# Patient Record
Sex: FEMALE | Race: WHITE | Employment: FULL TIME | URBAN - METROPOLITAN AREA
[De-identification: names, ages, dates, MRNs, and addresses within clinical notes are randomized per-mention and may not be internally consistent; named-entity substitution may affect disease eponyms.]

---

## 2018-04-16 ENCOUNTER — OFFICE VISIT (OUTPATIENT)
Dept: NEUROLOGY | Age: 63
End: 2018-04-16
Payer: COMMERCIAL

## 2018-04-16 VITALS
BODY MASS INDEX: 47.37 KG/M2 | WEIGHT: 235 LBS | HEIGHT: 59 IN | HEART RATE: 88 BPM | DIASTOLIC BLOOD PRESSURE: 95 MMHG | SYSTOLIC BLOOD PRESSURE: 188 MMHG

## 2018-04-16 DIAGNOSIS — I10 HYPERTENSION, UNSPECIFIED TYPE: ICD-10-CM

## 2018-04-16 DIAGNOSIS — Z86.73 HISTORY OF STROKE: Primary | ICD-10-CM

## 2018-04-16 DIAGNOSIS — E11.65 POORLY CONTROLLED DIABETES MELLITUS (HCC): ICD-10-CM

## 2018-04-16 PROCEDURE — 99205 OFFICE O/P NEW HI 60 MIN: CPT | Performed by: PSYCHIATRY & NEUROLOGY

## 2018-04-16 RX ORDER — ATORVASTATIN CALCIUM 40 MG/1
20 TABLET, FILM COATED ORAL DAILY
COMMUNITY

## 2018-04-23 ENCOUNTER — TELEPHONE (OUTPATIENT)
Dept: NEUROLOGY | Age: 63
End: 2018-04-23

## 2018-05-03 ENCOUNTER — TELEPHONE (OUTPATIENT)
Dept: NEUROLOGY | Age: 63
End: 2018-05-03

## 2018-05-07 ENCOUNTER — HOSPITAL ENCOUNTER (OUTPATIENT)
Dept: MRI IMAGING | Age: 63
Discharge: HOME | End: 2018-05-09
Payer: COMMERCIAL

## 2018-05-07 ENCOUNTER — APPOINTMENT (OUTPATIENT)
Dept: LAB | Age: 63
End: 2018-05-07
Payer: COMMERCIAL

## 2018-05-07 ENCOUNTER — APPOINTMENT (OUTPATIENT)
Dept: MRI IMAGING | Age: 63
End: 2018-05-07
Payer: COMMERCIAL

## 2018-05-07 DIAGNOSIS — Z86.73 HISTORY OF STROKE: ICD-10-CM

## 2018-05-07 DIAGNOSIS — I10 HYPERTENSION, UNSPECIFIED TYPE: ICD-10-CM

## 2018-05-07 PROCEDURE — 70544 MR ANGIOGRAPHY HEAD W/O DYE: CPT

## 2018-05-08 ENCOUNTER — HOSPITAL ENCOUNTER (OUTPATIENT)
Dept: LAB | Age: 63
Setting detail: SPECIMEN
Discharge: HOME | End: 2018-05-08
Payer: COMMERCIAL

## 2018-05-08 DIAGNOSIS — Z86.73 HISTORY OF STROKE: ICD-10-CM

## 2018-05-08 DIAGNOSIS — I10 HYPERTENSION, UNSPECIFIED TYPE: ICD-10-CM

## 2018-05-08 LAB
ABSOLUTE EOS #: 0.2 K/UL (ref 0–0.4)
ABSOLUTE IMMATURE GRANULOCYTE: ABNORMAL K/UL (ref 0–0.3)
ABSOLUTE LYMPH #: 1.9 K/UL (ref 1–4.8)
ABSOLUTE MONO #: 0.4 K/UL (ref 0.1–1.2)
ALBUMIN SERPL-MCNC: 4.2 G/DL (ref 3.5–5.2)
ALBUMIN/GLOBULIN RATIO: 1.3 (ref 1–2.5)
ALP BLD-CCNC: 92 U/L (ref 35–104)
ALT SERPL-CCNC: 18 U/L (ref 5–33)
ANION GAP SERPL CALCULATED.3IONS-SCNC: 11 MMOL/L (ref 9–17)
ANION GAP SERPL CALCULATED.3IONS-SCNC: 11 MMOL/L (ref 9–17)
AST SERPL-CCNC: 15 U/L
BASOPHILS # BLD: 1 % (ref 0–1)
BASOPHILS ABSOLUTE: 0.1 K/UL (ref 0–0.2)
BILIRUB SERPL-MCNC: 0.67 MG/DL (ref 0.3–1.2)
BUN BLDV-MCNC: 17 MG/DL (ref 8–23)
BUN BLDV-MCNC: 17 MG/DL (ref 8–23)
BUN/CREAT BLD: 32 (ref 9–20)
BUN/CREAT BLD: 32 (ref 9–20)
CALCIUM SERPL-MCNC: 9.6 MG/DL (ref 8.6–10.4)
CALCIUM SERPL-MCNC: 9.6 MG/DL (ref 8.6–10.4)
CHLORIDE BLD-SCNC: 95 MMOL/L (ref 98–107)
CHLORIDE BLD-SCNC: 95 MMOL/L (ref 98–107)
CHOLESTEROL/HDL RATIO: 2.2
CHOLESTEROL: 146 MG/DL
CO2: 32 MMOL/L (ref 20–31)
CO2: 32 MMOL/L (ref 20–31)
CREAT SERPL-MCNC: 0.53 MG/DL (ref 0.5–0.9)
CREAT SERPL-MCNC: 0.53 MG/DL (ref 0.5–0.9)
DIFFERENTIAL TYPE: ABNORMAL
EOSINOPHILS RELATIVE PERCENT: 3 % (ref 1–7)
ESTIMATED AVERAGE GLUCOSE: 214 MG/DL
GFR AFRICAN AMERICAN: >60 ML/MIN
GFR AFRICAN AMERICAN: >60 ML/MIN
GFR NON-AFRICAN AMERICAN: >60 ML/MIN
GFR NON-AFRICAN AMERICAN: >60 ML/MIN
GFR SERPL CREATININE-BSD FRML MDRD: ABNORMAL ML/MIN/{1.73_M2}
GLUCOSE BLD-MCNC: 204 MG/DL (ref 70–99)
GLUCOSE BLD-MCNC: 204 MG/DL (ref 70–99)
HBA1C MFR BLD: 9.1 % (ref 4.8–5.9)
HCT VFR BLD CALC: 44.7 % (ref 36–46)
HDLC SERPL-MCNC: 65 MG/DL
HEMOGLOBIN: 15 G/DL (ref 12–16)
IMMATURE GRANULOCYTES: ABNORMAL %
LDL CHOLESTEROL: 64 MG/DL (ref 0–130)
LYMPHOCYTES # BLD: 31 % (ref 16–46)
MCH RBC QN AUTO: 27.9 PG (ref 26–34)
MCHC RBC AUTO-ENTMCNC: 33.5 G/DL (ref 31–37)
MCV RBC AUTO: 83.2 FL (ref 80–100)
MONOCYTES # BLD: 7 % (ref 4–11)
NRBC AUTOMATED: ABNORMAL PER 100 WBC
PDW BLD-RTO: 13.3 % (ref 11–14.5)
PLATELET # BLD: 222 K/UL (ref 140–450)
PLATELET ESTIMATE: ABNORMAL
PMV BLD AUTO: 8.8 FL (ref 6–12)
POTASSIUM SERPL-SCNC: 4 MMOL/L (ref 3.7–5.3)
POTASSIUM SERPL-SCNC: 4 MMOL/L (ref 3.7–5.3)
RBC # BLD: 5.37 M/UL (ref 4–5.2)
RBC # BLD: ABNORMAL 10*6/UL
SEG NEUTROPHILS: 58 % (ref 43–77)
SEGMENTED NEUTROPHILS ABSOLUTE COUNT: 3.6 K/UL (ref 1.8–7.7)
SODIUM BLD-SCNC: 138 MMOL/L (ref 135–144)
SODIUM BLD-SCNC: 138 MMOL/L (ref 135–144)
T3 FREE: 3.93 PG/ML (ref 2.02–4.43)
THYROXINE, FREE: 1.53 NG/DL (ref 0.93–1.7)
TOTAL PROTEIN: 7.4 G/DL (ref 6.4–8.3)
TRIGL SERPL-MCNC: 83 MG/DL
TSH SERPL DL<=0.05 MIU/L-ACNC: 1.99 MIU/L (ref 0.3–5)
VITAMIN B-12: 530 PG/ML (ref 232–1245)
VLDLC SERPL CALC-MCNC: NORMAL MG/DL (ref 1–30)
WBC # BLD: 6.2 K/UL (ref 3.5–11)
WBC # BLD: ABNORMAL 10*3/UL

## 2018-05-08 PROCEDURE — 85025 COMPLETE CBC W/AUTO DIFF WBC: CPT

## 2018-05-08 PROCEDURE — 84439 ASSAY OF FREE THYROXINE: CPT

## 2018-05-08 PROCEDURE — 84481 FREE ASSAY (FT-3): CPT

## 2018-05-08 PROCEDURE — 82607 VITAMIN B-12: CPT

## 2018-05-08 PROCEDURE — 80061 LIPID PANEL: CPT

## 2018-05-08 PROCEDURE — 36415 COLL VENOUS BLD VENIPUNCTURE: CPT

## 2018-05-08 PROCEDURE — 84443 ASSAY THYROID STIM HORMONE: CPT

## 2018-05-08 PROCEDURE — 83036 HEMOGLOBIN GLYCOSYLATED A1C: CPT

## 2018-05-08 PROCEDURE — 80053 COMPREHEN METABOLIC PANEL: CPT

## 2018-06-11 ENCOUNTER — OFFICE VISIT (OUTPATIENT)
Dept: NEUROLOGY | Age: 63
End: 2018-06-11
Payer: COMMERCIAL

## 2018-06-11 ENCOUNTER — TELEPHONE (OUTPATIENT)
Dept: NEUROLOGY | Age: 63
End: 2018-06-11

## 2018-06-11 VITALS
BODY MASS INDEX: 46.97 KG/M2 | DIASTOLIC BLOOD PRESSURE: 91 MMHG | WEIGHT: 233 LBS | HEART RATE: 73 BPM | HEIGHT: 59 IN | SYSTOLIC BLOOD PRESSURE: 131 MMHG

## 2018-06-11 DIAGNOSIS — E11.65 POORLY CONTROLLED DIABETES MELLITUS (HCC): Primary | ICD-10-CM

## 2018-06-11 DIAGNOSIS — Z86.73 HISTORY OF STROKE: ICD-10-CM

## 2018-06-11 PROCEDURE — 99214 OFFICE O/P EST MOD 30 MIN: CPT | Performed by: PSYCHIATRY & NEUROLOGY

## 2018-06-11 RX ORDER — AMLODIPINE BESYLATE 5 MG/1
5 TABLET ORAL DAILY
COMMUNITY
End: 2020-08-25

## 2018-06-11 RX ORDER — HYDROCHLOROTHIAZIDE 12.5 MG/1
25 CAPSULE, GELATIN COATED ORAL DAILY
COMMUNITY
End: 2020-11-04

## 2018-11-09 ENCOUNTER — HOSPITAL ENCOUNTER (OUTPATIENT)
Dept: LAB | Age: 63
Discharge: HOME | End: 2018-11-09
Payer: COMMERCIAL

## 2018-11-09 LAB
CREATININE URINE: 214.5 MG/DL (ref 28–217)
ESTIMATED AVERAGE GLUCOSE: 266 MG/DL
HBA1C MFR BLD: 10.9 % (ref 4.8–5.9)
MICROALBUMIN/CREAT 24H UR: 14 MG/L
MICROALBUMIN/CREAT UR-RTO: 7 MCG/MG CREAT

## 2018-11-09 PROCEDURE — 36415 COLL VENOUS BLD VENIPUNCTURE: CPT

## 2018-11-09 PROCEDURE — 82570 ASSAY OF URINE CREATININE: CPT

## 2018-11-09 PROCEDURE — 83036 HEMOGLOBIN GLYCOSYLATED A1C: CPT

## 2018-11-09 PROCEDURE — 82043 UR ALBUMIN QUANTITATIVE: CPT

## 2020-06-02 ENCOUNTER — OFFICE VISIT (OUTPATIENT)
Dept: NEUROLOGY | Age: 65
End: 2020-06-02
Payer: COMMERCIAL

## 2020-06-02 VITALS
WEIGHT: 233 LBS | HEIGHT: 60 IN | BODY MASS INDEX: 45.75 KG/M2 | TEMPERATURE: 97 F | SYSTOLIC BLOOD PRESSURE: 122 MMHG | DIASTOLIC BLOOD PRESSURE: 87 MMHG | HEART RATE: 106 BPM

## 2020-06-02 PROCEDURE — G8417 CALC BMI ABV UP PARAM F/U: HCPCS | Performed by: PSYCHIATRY & NEUROLOGY

## 2020-06-02 PROCEDURE — 3017F COLORECTAL CA SCREEN DOC REV: CPT | Performed by: PSYCHIATRY & NEUROLOGY

## 2020-06-02 PROCEDURE — 1036F TOBACCO NON-USER: CPT | Performed by: PSYCHIATRY & NEUROLOGY

## 2020-06-02 PROCEDURE — 4040F PNEUMOC VAC/ADMIN/RCVD: CPT | Performed by: PSYCHIATRY & NEUROLOGY

## 2020-06-02 PROCEDURE — 99215 OFFICE O/P EST HI 40 MIN: CPT | Performed by: PSYCHIATRY & NEUROLOGY

## 2020-06-02 PROCEDURE — G8427 DOCREV CUR MEDS BY ELIG CLIN: HCPCS | Performed by: PSYCHIATRY & NEUROLOGY

## 2020-06-02 PROCEDURE — 1123F ACP DISCUSS/DSCN MKR DOCD: CPT | Performed by: PSYCHIATRY & NEUROLOGY

## 2020-06-02 PROCEDURE — 1090F PRES/ABSN URINE INCON ASSESS: CPT | Performed by: PSYCHIATRY & NEUROLOGY

## 2020-06-02 PROCEDURE — G8400 PT W/DXA NO RESULTS DOC: HCPCS | Performed by: PSYCHIATRY & NEUROLOGY

## 2020-06-02 RX ORDER — LIDOCAINE 50 MG/G
OINTMENT TOPICAL
Qty: 240 G | Refills: 3 | Status: SHIPPED | OUTPATIENT
Start: 2020-06-02

## 2020-06-02 RX ORDER — ROSUVASTATIN CALCIUM 20 MG/1
20 TABLET, COATED ORAL DAILY
COMMUNITY

## 2020-06-02 RX ORDER — DILTIAZEM HYDROCHLORIDE 120 MG/1
120 CAPSULE, COATED, EXTENDED RELEASE ORAL DAILY
COMMUNITY

## 2020-06-02 NOTE — PROGRESS NOTES
absent, Back pain: present, Stiffness: present, Muscle pain: present, Joint pain: absent Restless legs: absent    DERMATOLOGIC Hair loss: absent, Skin changes: absent    NEUROLOGIC Memory loss: absent, Confusion: absent, Seizures: absent Trouble walking or imbalance: present, Dizziness: present, Weakness: present, Numbness: present Tremor: absent, Spasm: present, Speech difficulty: absent, Headache: absent, Light sensitivity: absent    PSYCHIATRIC Anxiety:present, Hallucination:absent, Mood disorder: absent    HEMATOLOGIC Abnormal bleeding: absent, Anemia: absent, Clotting disorder: absent, Lymph gland changes: absent     VITALS  /87 (Site: Left Upper Arm, Position: Sitting)   Pulse 106   Temp 97 °F (36.1 °C)   Ht 5' (1.524 m)   Wt 233 lb (105.7 kg)   BMI 45.50 kg/m²        PHYSICAL EXAMINATIONS:     General appearance: cooperative  Skin: no rash or skin lesions. HEENT: normocephalic  Optic Fundi: deferred  Neck: supple, no cervcical adenopathy or carotid bruit  Lungs: clear to auscultation  Heart: Regular rate and rhythm, normal S1, S2. No murmurs, clicks or gallops. Peripheral pulses: radial pulses palpable  Abdominal: BS present, soft, NT, ND  Extremities: palpate pain on right leg    NEUROLOGICAL EXAMINATION:     MS: awake, alert and oriented. No aphasia, dysarthria, or neglect  CNs: PERRLA, EOMI, VF full, sensation decreased on left V2. V3 to temp/pp, face symmetric, hearing intact, soft palate rises on phonation, sternocleidomastoid and trapezius intact. Tongue midline, no fasciculations. Motor: no abnormal movements, tone and bulk okay. RUE: delta 5/5, biceps 5/5, triceps 5/5,  5/5  LUE: delta 5/5, biceps 5/5, triceps 5/5,  5/5  RLE: hf 2/5, ke 2/5, df 3/5, pf 3/5  LLE: hf 4/5, ke 4/5, df 4/5, pf 4/5  Reflexes: trace throughout, symmetric, babinski not present.   Coordination: FNF no dysmetria, heel to shin deferred, JJ limited on right more than left leg, Rhomberg defererd  Gait: on

## 2020-06-02 NOTE — PATIENT INSTRUCTIONS
1.Get lumbar MRI and EMG/NCV done  2. Exercise  3.  Fall precaution    Return in 2-3 weeks    Sandro Adair MD, MS

## 2020-08-19 ENCOUNTER — HOSPITAL ENCOUNTER (OUTPATIENT)
Dept: MAMMOGRAPHY | Age: 65
Discharge: HOME | End: 2020-08-21
Payer: COMMERCIAL

## 2020-08-19 PROCEDURE — G0279 TOMOSYNTHESIS, MAMMO: HCPCS

## 2020-08-25 ENCOUNTER — OFFICE VISIT (OUTPATIENT)
Dept: OTOLARYNGOLOGY | Age: 65
End: 2020-08-25
Payer: COMMERCIAL

## 2020-08-25 ENCOUNTER — HOSPITAL ENCOUNTER (OUTPATIENT)
Dept: LAB | Age: 65
Discharge: HOME | End: 2020-08-25
Payer: COMMERCIAL

## 2020-08-25 VITALS
RESPIRATION RATE: 14 BRPM | TEMPERATURE: 98 F | SYSTOLIC BLOOD PRESSURE: 128 MMHG | BODY MASS INDEX: 30.86 KG/M2 | WEIGHT: 158 LBS | DIASTOLIC BLOOD PRESSURE: 80 MMHG

## 2020-08-25 LAB
CREAT SERPL-MCNC: 0.66 MG/DL (ref 0.5–0.9)
GFR AFRICAN AMERICAN: >60 ML/MIN
GFR NON-AFRICAN AMERICAN: >60 ML/MIN
GFR SERPL CREATININE-BSD FRML MDRD: NORMAL ML/MIN/{1.73_M2}
GFR SERPL CREATININE-BSD FRML MDRD: NORMAL ML/MIN/{1.73_M2}

## 2020-08-25 PROCEDURE — 99203 OFFICE O/P NEW LOW 30 MIN: CPT | Performed by: OTOLARYNGOLOGY

## 2020-08-25 PROCEDURE — 82565 ASSAY OF CREATININE: CPT

## 2020-08-25 PROCEDURE — 36415 COLL VENOUS BLD VENIPUNCTURE: CPT

## 2020-08-25 NOTE — PROGRESS NOTES
8/25/2020 10:14 AM EDT    Chief Complaint:   Chief Complaint   Patient presents with    Facial Swelling     left sice of necki and right side of neck  ref Carlos Enrique Marlene    Numbness     diagnosed with shingles in January    Weight Loss     233lb 6-2-2020   now 158lb     80 pound weight loss       The patient is a 71y.o.  year old  female  who presents with a complaint of enlarged submandibular glands with onset 2-3 months ago. In October/Nov was on a liquid diet with severe caloric restriction. Had been trying to lose weight. States that she has lost about 80 lbs since then. Has diabetes, a fib. No kidney disease per patient. Has history of shingles. 2-3 months ago also noticed numbness of the left face and left otalgia. Dry mouth. Chronic picking of the nose. No change in taste. Pain in left ear associated with left facial numbness. There was no specific precipitating event. There are no aggravating or mitigating factors. Social History     Substance and Sexual Activity   Alcohol Use No     Social History     Tobacco Use   Smoking Status Never Smoker   Smokeless Tobacco Never Used     Drug Sensitivities: Patient has no known allergies.   Medications:  Outpatient Medications Prior to Visit   Medication Sig Dispense Refill    rosuvastatin (CRESTOR) 20 MG tablet Take 20 mg by mouth daily      dilTIAZem (CARTIA XT) 120 MG extended release capsule Take 120 mg by mouth daily      rivaroxaban (XARELTO) 20 MG TABS tablet Take 20 mg by mouth daily      lidocaine (XYLOCAINE) 5 % ointment Apply topically to right leg 4-5 times a day as needed for pain 240 g 3    hydrochlorothiazide (MICROZIDE) 12.5 MG capsule Take 25 mg by mouth daily      atorvastatin (LIPITOR) 40 MG tablet Take 20 mg by mouth daily       Insulin Glargine (LANTUS SC) Inject 25 mLs into the skin daily       Insulin Lispro (HUMALOG SC) Inject into the skin as needed      aspirin 81 MG tablet Take 81 mg by mouth daily      amLODIPine (NORVASC) 5 MG tablet Take 5 mg by mouth daily      metFORMIN (GLUCOPHAGE) 500 MG tablet Take 500 mg by mouth 2 times daily       No facility-administered medications prior to visit. Past Medical History:   Diagnosis Date    Cataracts, bilateral     Diabetes ([address]     Hyperlipidemia     Osteoarthritis     Stroke [facility])      Past Surgical History:   Procedure Laterality Date    CARDIAC CATHETERIZATION  10/2019    EYE SURGERY  2010    Cataracts     Family History   Problem Relation Age of Onset    Stroke Father     Breast Cancer Sister      ROS:   Blood pressure 128/80, temperature 98 °F (36.7 °C), resp. rate 14, weight 158 lb (71.7 kg). General: The patient is found to be alert and normally responsive female with grossly normal hearing, clear voice and normal articulation. Communication is without difficulty. Fatigue and limited mobility   Difficulty with transfer in and out of wheelchair   Voice: Clear   Skin: The skin has normal colour and turgor. Face: The facial contour is symmetric at rest and with movement. Left V2-3 hypoesthesia on the left face  Ears: The pinnae have normal contours. AD: EAC clear, TM intact, no effusion/erythema/retraction   AS: EAC clear, TM intact, no effusion/erythema/retraction   Eye: The ocular movements are full and symmetric, the conjunctiva is unremarkable; sclera are anicteric, pupillary response is symmetric. No nystagmus is found. Nose:   The external nasal contour is normal  The nasal mucosa is extremely excoriated and dry, no crusting  The nasal septum is straight. The turbinates have a normal appearance  The nares are patent without evidence of polyposis   Oral cavity:   The dentition is healthy. Wears maxillary denture. The oral mucosa is without lesions;  the tongue is symmetric with full mobility and is without fasciculation. The soft palate is symmetric.  Tongue has dry overlying epithelium with patches of white keratosis/geographic

## 2020-09-03 ENCOUNTER — HOSPITAL ENCOUNTER (OUTPATIENT)
Dept: CT IMAGING | Age: 65
Discharge: HOME | End: 2020-09-05
Payer: COMMERCIAL

## 2020-09-03 PROCEDURE — 70487 CT MAXILLOFACIAL W/DYE: CPT

## 2020-09-03 PROCEDURE — 2709999900 CT SOFT TISSUE NECK W CONTRAST

## 2020-09-03 PROCEDURE — 6360000004 HC RX CONTRAST MEDICATION: Performed by: OTOLARYNGOLOGY

## 2020-09-03 RX ADMIN — IOPAMIDOL 80 ML: 755 INJECTION, SOLUTION INTRAVENOUS at 10:43

## 2020-09-29 ENCOUNTER — OFFICE VISIT (OUTPATIENT)
Dept: OTOLARYNGOLOGY | Age: 65
End: 2020-09-29
Payer: COMMERCIAL

## 2020-09-29 VITALS
WEIGHT: 158 LBS | HEART RATE: 93 BPM | SYSTOLIC BLOOD PRESSURE: 128 MMHG | DIASTOLIC BLOOD PRESSURE: 88 MMHG | OXYGEN SATURATION: 99 % | BODY MASS INDEX: 31.85 KG/M2 | TEMPERATURE: 96.8 F | HEIGHT: 59 IN

## 2020-09-29 PROCEDURE — 99213 OFFICE O/P EST LOW 20 MIN: CPT | Performed by: OTOLARYNGOLOGY

## 2020-09-29 NOTE — PROGRESS NOTES
9/29/2020 10:14 AM EDT    Chief Complaint:   Chief Complaint   Patient presents with    Follow-up     recheck after CT of neck       The patient is a 76y.o.  year old  female  who presents with a complaint of enlarged submandibular glands with onset 2-3 months ago. In October/Nov was on a liquid diet with severe caloric restriction. Had been trying to lose weight. States that she has lost about 80 lbs since then. Has diabetes, a fib. No kidney disease per patient. Has recent history of shingles. 2-3 months ago also noticed numbness of the left face and left otalgia. Dry mouth. Chronic picking of the nose. No change in taste. Pain in left ear associated with left facial numbness. States that she ate a quarter of can of soup and half of big mac hamberger yesterday and that was it. She endorses that she has a very poor diet and does not take a multivitamin. Following up for scans. Has recently seen neurology.      Ct sinus:  FINDINGS:    SINUSES/MASTOIDS:  There is very minimal mucosal thickening within the    frontal sinuses.  There is mucosal thickening within a few bilateral ethmoid    air cells.  Sphenoid locules are well aerated. Maribeth Comment is minimal bilateral    maxillary mucosal thickening.  1.7 cm ovoid density in the inferior left    maxillary sinus (sagittal image 104) may represent mucous retention cyst or    polyp.  Mucosal thickening results in minimal narrowing of the ostiomeatal    units which are patent.  No air-fluid levels are identified to suggest    presence of acute paranasal sinus disease.  There is leftward deviation of    the anterior superior aspect of the nasal septum.  There is small maggie    bullosa of right middle nasal turbinate.  The mastoid air cells are well    aerated.         SOFT TISSUES:  Visualized soft tissues demonstrate no acute abnormality.  The    visualized portion of the intracranial contents demonstrate no gross acute    abnormality.              Impression    Minimal paranasal sinus disease.  No air-fluid levels are identified to    suggest component of acute paranasal sinus disease as described above.           Ct neck:  FINDINGS:    PHARYNX/LARYNX:  The nasopharynx, oropharynx and hypopharynx are normal in    appearance.  The epiglottis and aryepiglottic folds are normal.  The glottis    is normal.  The tongue is normal in appearance.  There is no airway narrowing.         SALIVARY GLANDS/THYROID:  The bilateral submandibular glands are enlarged and    heterogeneous in appearance.  There is surrounding inflammatory stranding. There is no soft tissue mass, sialolith or abnormal fluid collection    identified.  The parotid glands are normal in appearance.  The thyroid gland    is normal in appearance.         LYMPH NODES: Monika Friday is no pathologically enlarged lymphadenopathy identified. There are prominent, likely reactive submental and submandibular lymph nodes.         BRAIN/ORBITS/SINUSES:  Limited evaluation of brain and orbits is    unremarkable. Monika Friday is a mucous retention cyst within the left maxillary    sinus.  The visualized portions of the paranasal sinuses and mastoid air    cells are otherwise clear.         LUNG APICES/SUPERIOR MEDIASTINUM:  The lung apices are clear.  There is no    superior mediastinal lymphadenopathy.         BONES:  No lytic or blastic osseous lesions are identified.              Impression    Heterogeneous enlargement of the bilateral submandibular glands with    surrounding inflammatory stranding suggesting an acute bilateral    submandibular sialoadenitis. Monika Friday is no sialolith or abscess identified. The findings suggest a viral etiology. There was no specific precipitating event. There are no aggravating or mitigating factors.     Social History     Substance and Sexual Activity   Alcohol Use No     Social History     Tobacco Use   Smoking Status Never Smoker   Smokeless Tobacco Never Used     Drug Sensitivities: Patient effusion/erythema/retraction   Eye: The ocular movements are full and symmetric, the conjunctiva is unremarkable; sclera are anicteric, pupillary response is symmetric. No nystagmus is found. Nose:   The external nasal contour is normal  The nasal mucosa is extremely excoriated and dry, no crusting  The nasal septum is straight. The turbinates have a normal appearance  The nares are patent without evidence of polyposis   Oral cavity:   The dentition is healthy. Wears maxillary denture. The oral mucosa is without lesions;  the tongue is symmetric with full mobility and is without fasciculation. The soft palate is symmetric. Tongue has dry overlying epithelium with patches of white keratosis/geographic tongue  Dry oral mucosa throughout, tacky dry buccal mucosa, no purulent saliva expressed from laney or wharthin duct. No stones of bimanual palpation  The oropharynx is unremarkable.   Neck: The neck has a normal contour; firm and enlarged submandibular glands, no overlying skin changes, non tender    IMP:    1. Malnutrition, unspecified type ([address]         Plan:     Suspect tongue, xerostomia, and gland enlargement are due to nutritional deficiency, dehydration, and co-morbidities  Biotene, sialogogues, sips of water   Referral to nutritionist - concern about malnutrition, fatigue and extreme weight loss as possibly underlying cause   Increase hydration and general medical health   Continue to work on diabetes control (hba1c 10) as DM possible underlying cause of sialosis   Continue to follow with neuro  Facial numbness likely shingles/viral related   Consider ssa, ssb labs for sjogrens  Possible viral sialoadenitis - mumps, echovirus, coxsackie A, hiv, influence, cmv   Fu 2 months     Orders:   Orders Placed This Encounter   Procedures    Luisa Hernandez, Nutrition, [place]     Referral Priority:   Routine     Referral Type:   Eval and Treat     Referral Reason:   Specialty Services Required Referred to Provider:   Clifton Rolle RD, LD     Requested Specialty:   Dietitian     Number of Visits Requested:   1        Rx:   No orders of the defined types were placed in this encounter.

## 2020-10-30 ENCOUNTER — TELEPHONE (OUTPATIENT)
Dept: FAMILY MEDICINE CLINIC | Age: 65
End: 2020-10-30

## 2020-10-30 NOTE — TELEPHONE ENCOUNTER
rx for Lantus 25 units SQ  qd #30 days/5 refills Called into [facility] talked with Charlie Gonzalez

## 2020-11-04 ENCOUNTER — OFFICE VISIT (OUTPATIENT)
Dept: FAMILY MEDICINE CLINIC | Age: 65
End: 2020-11-04
Payer: COMMERCIAL

## 2020-11-04 VITALS
DIASTOLIC BLOOD PRESSURE: 81 MMHG | HEART RATE: 100 BPM | OXYGEN SATURATION: 96 % | HEIGHT: 59 IN | SYSTOLIC BLOOD PRESSURE: 132 MMHG | TEMPERATURE: 98.2 F | BODY MASS INDEX: 31.25 KG/M2 | WEIGHT: 155 LBS

## 2020-11-04 PROCEDURE — 99214 OFFICE O/P EST MOD 30 MIN: CPT | Performed by: FAMILY MEDICINE

## 2020-11-04 RX ORDER — CEPHALEXIN 500 MG/1
CAPSULE ORAL
COMMUNITY
Start: 2020-11-02

## 2020-11-04 RX ORDER — GABAPENTIN 300 MG/1
300 CAPSULE ORAL 3 TIMES DAILY PRN
COMMUNITY
Start: 2020-10-26

## 2020-11-04 RX ORDER — LANOLIN ALCOHOL/MO/W.PET/CERES
CREAM (GRAM) TOPICAL
COMMUNITY
Start: 2020-10-26

## 2020-11-04 RX ORDER — HYDROCHLOROTHIAZIDE 25 MG/1
25 TABLET ORAL DAILY
COMMUNITY
Start: 2020-10-06

## 2020-11-04 ASSESSMENT — PATIENT HEALTH QUESTIONNAIRE - PHQ9
SUM OF ALL RESPONSES TO PHQ QUESTIONS 1-9: 0
SUM OF ALL RESPONSES TO PHQ9 QUESTIONS 1 & 2: 0
1. LITTLE INTEREST OR PLEASURE IN DOING THINGS: 0
2. FEELING DOWN, DEPRESSED OR HOPELESS: 0
SUM OF ALL RESPONSES TO PHQ QUESTIONS 1-9: 0
SUM OF ALL RESPONSES TO PHQ QUESTIONS 1-9: 0

## 2020-11-04 NOTE — PROGRESS NOTES
Subjective:      Patient ID: Sharron Marcelino is a 76 y.o. female. F/u bloody nose  Cant walk  cholesterrol dose to be 40? Review of Systems   Constitutional: Positive for activity change. HENT:        Has tamponade in right nare   Neurological:        Leg weakness being evaluated   Hematological:        Anemia at 8 no PRBCs or labs       Objective:   Physical Exam    Assessment:      1. Neuropathy    2. Epistaxis    3.  Anemia, unspecified type            Plan:      [facility] was seen today for follow-up from hospital.    Diagnoses and all orders for this visit:    Neuropathy    Epistaxis    Anemia, unspecified type    insturcted on how to remove at home  More labs  Go to 40 mg on lipitor            Electronically signed by Rober Bal MD on 11/4/2020 at 3:37 PM

## 2020-11-10 ENCOUNTER — TELEPHONE (OUTPATIENT)
Dept: FAMILY MEDICINE CLINIC | Age: 65
End: 2020-11-10

## 2020-11-10 NOTE — TELEPHONE ENCOUNTER
Pt called and needs to have dr. Méndez pharmacy to verify the Hydrochlorothiazide that she takes. The pharmacy is the [facility] in [place] [address]. She said they have been trying to reach the Dr. Emerita Duong.

## 2020-11-18 ENCOUNTER — OFFICE VISIT (OUTPATIENT)
Dept: PRIMARY CARE CLINIC | Age: 65
End: 2020-11-18
Payer: COMMERCIAL

## 2020-11-18 VITALS
OXYGEN SATURATION: 95 % | TEMPERATURE: 98 F | HEIGHT: 59 IN | HEART RATE: 105 BPM | WEIGHT: 155 LBS | BODY MASS INDEX: 31.25 KG/M2 | DIASTOLIC BLOOD PRESSURE: 86 MMHG | SYSTOLIC BLOOD PRESSURE: 136 MMHG

## 2020-11-18 PROCEDURE — 30901 CONTROL OF NOSEBLEED: CPT | Performed by: FAMILY MEDICINE

## 2020-11-18 PROCEDURE — 99214 OFFICE O/P EST MOD 30 MIN: CPT | Performed by: FAMILY MEDICINE

## 2020-11-18 ASSESSMENT — PATIENT HEALTH QUESTIONNAIRE - PHQ9
SUM OF ALL RESPONSES TO PHQ QUESTIONS 1-9: 0
2. FEELING DOWN, DEPRESSED OR HOPELESS: 0
1. LITTLE INTEREST OR PLEASURE IN DOING THINGS: 0
SUM OF ALL RESPONSES TO PHQ QUESTIONS 1-9: 0
SUM OF ALL RESPONSES TO PHQ QUESTIONS 1-9: 0
SUM OF ALL RESPONSES TO PHQ9 QUESTIONS 1 & 2: 0

## 2020-11-19 ASSESSMENT — ENCOUNTER SYMPTOMS: RESPIRATORY NEGATIVE: 1

## 2020-11-19 NOTE — PROGRESS NOTES
Subjective:      Patient ID: Mike Cleaning is a 76 y.o. female. H/o nose bleed packing removed started again last night wont stop  Also no one can come up with explanation for inability to [facility] over last few months      Review of Systems   Constitutional: Positive for activity change. Negative for fever. HENT: Positive for nosebleeds. Respiratory: Negative. Cardiovascular: Negative. Neurological: Positive for weakness. Hematological: Negative. Psychiatric/Behavioral: The patient is nervous/anxious. Objective:   Physical Exam  HENT:      Head: Normocephalic. Nose:      Comments: Small spot anterior right with bleeding  Musculoskeletal:      Comments: Limited motion of legs   Neurological:      Mental Status: She is alert. Comments: Left facial numbness         Assessment:      1. Epistaxis    2. Unable to walk            Plan:      [facility] Rejilion was seen today for follow-up.     Diagnoses and all orders for this visit:    Epistaxis    Unable to walk    AgNO3 cautery  Since brain back and neuro not revealing will refer to [facility]            Electronically signed by Aries Rinaldi MD on 11/18/2020 at 12:34 PM

## 2020-11-25 ENCOUNTER — TELEPHONE (OUTPATIENT)
Dept: PRIMARY CARE CLINIC | Age: 65
End: 2020-11-25

## 2020-12-13 PROBLEM — E16.2 HYPOGLYCEMIA: Status: ACTIVE | Noted: 2020-12-13

## 2020-12-28 ENCOUNTER — TELEPHONE (OUTPATIENT)
Dept: INTERNAL MEDICINE | Age: 65
End: 2020-12-28

## 2020-12-28 NOTE — TELEPHONE ENCOUNTER
Received fax from [facility], see attached scan

## 2021-06-18 ENCOUNTER — TELEPHONE (OUTPATIENT)
Dept: PRIMARY CARE CLINIC | Age: 66
End: 2021-06-18